# Patient Record
Sex: MALE | Race: WHITE | NOT HISPANIC OR LATINO
[De-identification: names, ages, dates, MRNs, and addresses within clinical notes are randomized per-mention and may not be internally consistent; named-entity substitution may affect disease eponyms.]

---

## 2019-01-25 ENCOUNTER — APPOINTMENT (OUTPATIENT)
Dept: FAMILY MEDICINE | Facility: CLINIC | Age: 54
End: 2019-01-25
Payer: COMMERCIAL

## 2019-01-25 VITALS
OXYGEN SATURATION: 93 % | SYSTOLIC BLOOD PRESSURE: 116 MMHG | HEART RATE: 55 BPM | WEIGHT: 178 LBS | BODY MASS INDEX: 25.48 KG/M2 | TEMPERATURE: 98.6 F | HEIGHT: 70 IN | DIASTOLIC BLOOD PRESSURE: 77 MMHG

## 2019-01-25 DIAGNOSIS — Z87.898 PERSONAL HISTORY OF OTHER SPECIFIED CONDITIONS: ICD-10-CM

## 2019-01-25 DIAGNOSIS — Z87.01 PERSONAL HISTORY OF PNEUMONIA (RECURRENT): ICD-10-CM

## 2019-01-25 DIAGNOSIS — Z81.8 FAMILY HISTORY OF OTHER MENTAL AND BEHAVIORAL DISORDERS: ICD-10-CM

## 2019-01-25 DIAGNOSIS — Z86.19 PERSONAL HISTORY OF OTHER INFECTIOUS AND PARASITIC DISEASES: ICD-10-CM

## 2019-01-25 DIAGNOSIS — G62.9 POLYNEUROPATHY, UNSPECIFIED: ICD-10-CM

## 2019-01-25 DIAGNOSIS — Z87.891 PERSONAL HISTORY OF NICOTINE DEPENDENCE: ICD-10-CM

## 2019-01-25 DIAGNOSIS — Z83.3 FAMILY HISTORY OF DIABETES MELLITUS: ICD-10-CM

## 2019-01-25 DIAGNOSIS — F31.9 BIPOLAR DISORDER, UNSPECIFIED: ICD-10-CM

## 2019-01-25 DIAGNOSIS — Z81.1 FAMILY HISTORY OF ALCOHOL ABUSE AND DEPENDENCE: ICD-10-CM

## 2019-01-25 PROCEDURE — 36415 COLL VENOUS BLD VENIPUNCTURE: CPT

## 2019-01-25 PROCEDURE — 99386 PREV VISIT NEW AGE 40-64: CPT | Mod: 25

## 2019-01-25 RX ORDER — DIVALPROEX SODIUM 500 1/1
500 TABLET, EXTENDED RELEASE ORAL DAILY
Refills: 0 | Status: ACTIVE | COMMUNITY
Start: 2019-01-25

## 2019-01-25 NOTE — ASSESSMENT
[FreeTextEntry1] : This is a 54 y/o male with PMHx including Bipolar disorder II sees Dr Richy Talley and therapist is Carisa Galvez, presenting for CPE and to establish as a new patient in the practice\par \par PSYCH: h/o bipolar disorder\par -Continue Care with Dr Talley\par -Continue Xanax, Depakote.\par \par Neuro: Plantar neuropathy\par -Continue Gabapentin prn\par \par HCM:\par -Declines flu vaccine\par -Consents for HIV and Hep C testing\par -Fasting labs\par -Will get report from GI - Colonoscopy

## 2019-01-25 NOTE — PHYSICAL EXAM

## 2019-01-25 NOTE — HEALTH RISK ASSESSMENT
[Good] : ~his/her~  mood as  good [No falls in past year] : Patient reported no falls in the past year [0] : 2) Feeling down, depressed, or hopeless: Not at all (0) [Patient reported colonoscopy was normal] : Patient reported colonoscopy was normal [HIV Test offered] : HIV Test offered [Hepatitis C test offered] : Hepatitis C test offered [None] : None [With Family] : lives with family [# of Members in Household ___] :  household currently consist of [unfilled] member(s) [Employed] : employed [College] : College [] :  [# Of Children ___] : has [unfilled] children [Sexually Active] : sexually active [Feels Safe at Home] : Feels safe at home [Fully functional (bathing, dressing, toileting, transferring, walking, feeding)] : Fully functional (bathing, dressing, toileting, transferring, walking, feeding) [Fully functional (using the telephone, shopping, preparing meals, housekeeping, doing laundry, using] : Fully functional and needs no help or supervision to perform IADLs (using the telephone, shopping, preparing meals, housekeeping, doing laundry, using transportation, managing medications and managing finances) [Smoke Detector] : smoke detector [Carbon Monoxide Detector] : carbon monoxide detector [Seat Belt] :  uses seat belt [Sunscreen] : uses sunscreen [Discussed at today's visit] : Advance Directives Discussed at today's visit [FreeTextEntry1] : foot pain [] : No [[id number]] : 0 [Change in mental status noted] : No change in mental status noted [Language] : denies difficulty with language [Behavior] : denies difficulty with behavior [Learning/Retaining New Information] : denies difficulty learning/retaining new information [Handling Complex Tasks] : denies difficulty handling complex tasks [Reasoning] : denies difficulty with reasoning [Spatial Ability and Orientation] : denies difficulty with spatial ability and orientation [High Risk Behavior] : no high risk behavior [Reports changes in hearing] : Reports no changes in hearing [Reports changes in vision] : Reports no changes in vision [Reports changes in dental health] : Reports no changes in dental health [Guns at Home] : no guns at home [Travel to Developing Areas] : does not  travel to developing areas [TB Exposure] : is not being exposed to tuberculosis [Caregiver Concerns] : does not have caregiver concerns [ColonoscopyDate] : 2017 [ColonoscopyComments] : Dr Ga [de-identified] : retired [FreeTextEntry2] : Used to be a

## 2019-01-25 NOTE — COUNSELING
[Weight management counseling provided] : Weight management [Healthy eating counseling provided] : healthy eating [Activity counseling provided] : activity [Smoking cessation counseling provided] : smoking cessation [Behavioral health counseling provided] : behavioral health  [___ min/wk activity recommended] : [unfilled] min/wk activity recommended [None] : None [Good understanding] : Patient has a good understanding of lifestyle changes and the steps needed to achieve self management goals

## 2019-01-25 NOTE — HISTORY OF PRESENT ILLNESS
[FreeTextEntry1] : establish as a new patient in the practice and CPE [de-identified] : This is a 52 y/o male with PMHx including Bipolar disorder II sees Dr Richy Talley and therapist is Carisa Galvez, presenting for CPE and to establish as a new patient in the practice.

## 2019-01-29 LAB
25(OH)D3 SERPL-MCNC: 25.8 NG/ML
ALBUMIN SERPL ELPH-MCNC: 4.2 G/DL
ALP BLD-CCNC: 52 U/L
ALT SERPL-CCNC: 22 U/L
ANION GAP SERPL CALC-SCNC: 10 MMOL/L
APPEARANCE: CLEAR
AST SERPL-CCNC: 23 U/L
BACTERIA: NEGATIVE
BASOPHILS # BLD AUTO: 0.02 K/UL
BASOPHILS NFR BLD AUTO: 0.5 %
BILIRUB SERPL-MCNC: 0.6 MG/DL
BILIRUBIN URINE: NEGATIVE
BLOOD URINE: NEGATIVE
BUN SERPL-MCNC: 9 MG/DL
CALCIUM SERPL-MCNC: 9.3 MG/DL
CHLORIDE SERPL-SCNC: 102 MMOL/L
CHOLEST SERPL-MCNC: 169 MG/DL
CHOLEST/HDLC SERPL: 3.5 RATIO
CO2 SERPL-SCNC: 25 MMOL/L
COLOR: YELLOW
CREAT SERPL-MCNC: 0.81 MG/DL
EOSINOPHIL # BLD AUTO: 0.23 K/UL
EOSINOPHIL NFR BLD AUTO: 5.2 %
GLUCOSE QUALITATIVE U: NEGATIVE MG/DL
GLUCOSE SERPL-MCNC: 87 MG/DL
HBA1C MFR BLD HPLC: 5.4 %
HCT VFR BLD CALC: 39.5 %
HCV AB SER QL: NONREACTIVE
HCV S/CO RATIO: 0.06 S/CO
HDLC SERPL-MCNC: 48 MG/DL
HGB BLD-MCNC: 13.4 G/DL
HIV1+2 AB SPEC QL IA.RAPID: NONREACTIVE
IMM GRANULOCYTES NFR BLD AUTO: 0.2 %
KETONES URINE: NEGATIVE
LDLC SERPL CALC-MCNC: 104 MG/DL
LEUKOCYTE ESTERASE URINE: NEGATIVE
LYMPHOCYTES # BLD AUTO: 1.87 K/UL
LYMPHOCYTES NFR BLD AUTO: 42.5 %
MAN DIFF?: NORMAL
MCHC RBC-ENTMCNC: 30.2 PG
MCHC RBC-ENTMCNC: 33.9 GM/DL
MCV RBC AUTO: 89 FL
MICROSCOPIC-UA: NORMAL
MONOCYTES # BLD AUTO: 0.24 K/UL
MONOCYTES NFR BLD AUTO: 5.5 %
NEUTROPHILS # BLD AUTO: 2.03 K/UL
NEUTROPHILS NFR BLD AUTO: 46.1 %
NITRITE URINE: NEGATIVE
PH URINE: 6.5
PLATELET # BLD AUTO: 175 K/UL
POTASSIUM SERPL-SCNC: 4.2 MMOL/L
PROT SERPL-MCNC: 6.4 G/DL
PROTEIN URINE: NEGATIVE MG/DL
PSA SERPL-MCNC: 0.99 NG/ML
RBC # BLD: 4.44 M/UL
RBC # FLD: 13.3 %
RED BLOOD CELLS URINE: 0 /HPF
SODIUM SERPL-SCNC: 137 MMOL/L
SPECIFIC GRAVITY URINE: 1
SQUAMOUS EPITHELIAL CELLS: 0 /HPF
TRIGL SERPL-MCNC: 84 MG/DL
TSH SERPL-ACNC: 1.63 UIU/ML
UROBILINOGEN URINE: NEGATIVE MG/DL
WBC # FLD AUTO: 4.4 K/UL
WHITE BLOOD CELLS URINE: 0 /HPF

## 2019-01-31 DIAGNOSIS — H61.899 OTHER SPECIFIED DISORDERS OF EXTERNAL EAR, UNSPECIFIED EAR: ICD-10-CM

## 2019-02-26 ENCOUNTER — APPOINTMENT (OUTPATIENT)
Dept: INTERNAL MEDICINE | Facility: CLINIC | Age: 54
End: 2019-02-26

## 2019-05-09 ENCOUNTER — APPOINTMENT (OUTPATIENT)
Dept: FAMILY MEDICINE | Facility: CLINIC | Age: 54
End: 2019-05-09
Payer: COMMERCIAL

## 2019-05-09 VITALS
BODY MASS INDEX: 25.91 KG/M2 | DIASTOLIC BLOOD PRESSURE: 91 MMHG | TEMPERATURE: 97.1 F | OXYGEN SATURATION: 97 % | SYSTOLIC BLOOD PRESSURE: 151 MMHG | HEIGHT: 70 IN | HEART RATE: 64 BPM | WEIGHT: 181 LBS | RESPIRATION RATE: 18 BRPM

## 2019-05-09 DIAGNOSIS — Z82.49 FAMILY HISTORY OF ISCHEMIC HEART DISEASE AND OTHER DISEASES OF THE CIRCULATORY SYSTEM: ICD-10-CM

## 2019-05-09 PROCEDURE — 99212 OFFICE O/P EST SF 10 MIN: CPT

## 2019-05-09 NOTE — ASSESSMENT
[FreeTextEntry1] : As per patient's request, will give him referral for Cardiology and Dermatology.\par Will follow up on recommendations / plan of care.

## 2019-05-09 NOTE — HISTORY OF PRESENT ILLNESS
[FreeTextEntry1] : referrals [de-identified] : This is a 52 y/o male with PMHx including Bipolar disorder II sees Dr Richy Talley and therapist is Carisa Galvez, presenting requesting to have referral s for specialties. Pt requests referral for cardiology, last visit 5 years ago (Dr Christina), pt worried because strong family h/o CAD (Father and Brother in their 50s), also wants full body skin examination since he spends a lot of time in the sun and has fair skin. Pt currently offers no new complains.

## 2019-06-10 ENCOUNTER — APPOINTMENT (OUTPATIENT)
Dept: CARDIOLOGY | Facility: CLINIC | Age: 54
End: 2019-06-10
Payer: COMMERCIAL

## 2019-06-10 ENCOUNTER — NON-APPOINTMENT (OUTPATIENT)
Age: 54
End: 2019-06-10

## 2019-06-10 VITALS — DIASTOLIC BLOOD PRESSURE: 90 MMHG | SYSTOLIC BLOOD PRESSURE: 151 MMHG

## 2019-06-10 VITALS
RESPIRATION RATE: 18 BRPM | HEART RATE: 62 BPM | DIASTOLIC BLOOD PRESSURE: 81 MMHG | OXYGEN SATURATION: 98 % | WEIGHT: 180 LBS | SYSTOLIC BLOOD PRESSURE: 130 MMHG | BODY MASS INDEX: 25.83 KG/M2

## 2019-06-10 DIAGNOSIS — I51.89 OTHER ILL-DEFINED HEART DISEASES: ICD-10-CM

## 2019-06-10 PROCEDURE — 93000 ELECTROCARDIOGRAM COMPLETE: CPT

## 2019-06-10 PROCEDURE — 99204 OFFICE O/P NEW MOD 45 MIN: CPT

## 2019-06-10 RX ORDER — ALPRAZOLAM 1 MG/1
1 TABLET ORAL DAILY
Refills: 0 | Status: ACTIVE | COMMUNITY

## 2019-06-10 NOTE — PHYSICAL EXAM
[General Appearance - Well Developed] : well developed [Well Groomed] : well groomed [Normal Appearance] : normal appearance [General Appearance - Well Nourished] : well nourished [No Deformities] : no deformities [Normal Conjunctiva] : the conjunctiva exhibited no abnormalities [General Appearance - In No Acute Distress] : no acute distress [Normal Oral Mucosa] : normal oral mucosa [FreeTextEntry1] : No JVD [Heart Rate And Rhythm] : heart rate and rhythm were normal [Heart Sounds] : normal S1 and S2 [Murmurs] : no murmurs present [Auscultation Breath Sounds / Voice Sounds] : lungs were clear to auscultation bilaterally [Bowel Sounds] : normal bowel sounds [Abdomen Soft] : soft [Cyanosis, Localized] : no localized cyanosis [Abnormal Walk] : normal gait [] : no rash [Oriented To Time, Place, And Person] : oriented to person, place, and time

## 2019-06-10 NOTE — HISTORY OF PRESENT ILLNESS
[FreeTextEntry1] : Patient is a 53 year old  male with strong family history of premature CAD, father had MI at 49, had CABG, other family members have history of premature CAD. Patient has no DM or HTN. Patient has no history of CAD or MI. Patient denies any smoking history. Patient has no complaints. No exertional complaints. All other systems reviewed negative.

## 2019-06-10 NOTE — ASSESSMENT
[FreeTextEntry1] : EKG- Sinus bradycardia\par \par Assessment:\par 1. Sinus Bradycardia\par 2. Strong family hisotry of premature CAD\par \par Recommendations:\par 1. Stress Test and ECHO\par 2. If both tests are normal, patient is advised to f/u in 1 year

## 2019-06-27 ENCOUNTER — APPOINTMENT (OUTPATIENT)
Dept: DERMATOLOGY | Facility: CLINIC | Age: 54
End: 2019-06-27
Payer: COMMERCIAL

## 2019-06-27 PROCEDURE — 99202 OFFICE O/P NEW SF 15 MIN: CPT

## 2019-07-11 ENCOUNTER — OUTPATIENT (OUTPATIENT)
Dept: OUTPATIENT SERVICES | Facility: HOSPITAL | Age: 54
LOS: 1 days | End: 2019-07-11
Payer: COMMERCIAL

## 2019-07-11 DIAGNOSIS — R00.1 BRADYCARDIA, UNSPECIFIED: ICD-10-CM

## 2019-07-11 PROCEDURE — 93306 TTE W/DOPPLER COMPLETE: CPT

## 2019-07-11 PROCEDURE — 93306 TTE W/DOPPLER COMPLETE: CPT | Mod: 26

## 2019-07-12 ENCOUNTER — APPOINTMENT (OUTPATIENT)
Dept: CARDIOLOGY | Facility: CLINIC | Age: 54
End: 2019-07-12
Payer: COMMERCIAL

## 2019-07-12 PROCEDURE — 93015 CV STRESS TEST SUPVJ I&R: CPT

## 2019-07-13 ENCOUNTER — TRANSCRIPTION ENCOUNTER (OUTPATIENT)
Age: 54
End: 2019-07-13

## 2019-07-15 DIAGNOSIS — R94.39 ABNORMAL RESULT OF OTHER CARDIOVASCULAR FUNCTION STUDY: ICD-10-CM

## 2019-08-07 ENCOUNTER — APPOINTMENT (OUTPATIENT)
Dept: CARDIOLOGY | Facility: CLINIC | Age: 54
End: 2019-08-07

## 2019-08-07 ENCOUNTER — OTHER (OUTPATIENT)
Age: 54
End: 2019-08-07

## 2019-08-16 ENCOUNTER — APPOINTMENT (OUTPATIENT)
Dept: CARDIOLOGY | Facility: CLINIC | Age: 54
End: 2019-08-16

## 2019-08-20 ENCOUNTER — OUTPATIENT (OUTPATIENT)
Dept: OUTPATIENT SERVICES | Facility: HOSPITAL | Age: 54
LOS: 1 days | End: 2019-08-20
Payer: COMMERCIAL

## 2019-08-20 DIAGNOSIS — R94.39 ABNORMAL RESULT OF OTHER CARDIOVASCULAR FUNCTION STUDY: ICD-10-CM

## 2019-08-20 PROCEDURE — 93351 STRESS TTE COMPLETE: CPT

## 2019-08-20 PROCEDURE — 93351 STRESS TTE COMPLETE: CPT | Mod: 26

## 2020-01-22 ENCOUNTER — TRANSCRIPTION ENCOUNTER (OUTPATIENT)
Age: 55
End: 2020-01-22

## 2020-06-22 ENCOUNTER — APPOINTMENT (OUTPATIENT)
Dept: CARDIOLOGY | Facility: CLINIC | Age: 55
End: 2020-06-22

## 2020-07-12 ENCOUNTER — TRANSCRIPTION ENCOUNTER (OUTPATIENT)
Age: 55
End: 2020-07-12

## 2021-09-26 ENCOUNTER — TRANSCRIPTION ENCOUNTER (OUTPATIENT)
Age: 56
End: 2021-09-26

## 2021-11-29 ENCOUNTER — NON-APPOINTMENT (OUTPATIENT)
Age: 56
End: 2021-11-29

## 2021-12-15 ENCOUNTER — APPOINTMENT (OUTPATIENT)
Dept: INTERNAL MEDICINE | Facility: CLINIC | Age: 56
End: 2021-12-15
Payer: COMMERCIAL

## 2021-12-15 ENCOUNTER — NON-APPOINTMENT (OUTPATIENT)
Age: 56
End: 2021-12-15

## 2021-12-15 VITALS
BODY MASS INDEX: 25.62 KG/M2 | HEART RATE: 30 BPM | SYSTOLIC BLOOD PRESSURE: 136 MMHG | TEMPERATURE: 97.7 F | RESPIRATION RATE: 17 BRPM | HEIGHT: 70 IN | WEIGHT: 179 LBS | OXYGEN SATURATION: 99 % | DIASTOLIC BLOOD PRESSURE: 83 MMHG

## 2021-12-15 DIAGNOSIS — G60.3 IDIOPATHIC PROGRESSIVE NEUROPATHY: ICD-10-CM

## 2021-12-15 DIAGNOSIS — Z76.89 PERSONS ENCOUNTERING HEALTH SERVICES IN OTHER SPECIFIED CIRCUMSTANCES: ICD-10-CM

## 2021-12-15 DIAGNOSIS — Z13.31 ENCOUNTER FOR SCREENING FOR DEPRESSION: ICD-10-CM

## 2021-12-15 DIAGNOSIS — M19.90 UNSPECIFIED OSTEOARTHRITIS, UNSPECIFIED SITE: ICD-10-CM

## 2021-12-15 DIAGNOSIS — E55.9 VITAMIN D DEFICIENCY, UNSPECIFIED: ICD-10-CM

## 2021-12-15 DIAGNOSIS — R00.1 BRADYCARDIA, UNSPECIFIED: ICD-10-CM

## 2021-12-15 DIAGNOSIS — Z23 ENCOUNTER FOR IMMUNIZATION: ICD-10-CM

## 2021-12-15 DIAGNOSIS — Z13.6 ENCOUNTER FOR SCREENING FOR CARDIOVASCULAR DISORDERS: ICD-10-CM

## 2021-12-15 PROCEDURE — G0444 DEPRESSION SCREEN ANNUAL: CPT | Mod: 59

## 2021-12-15 PROCEDURE — 93000 ELECTROCARDIOGRAM COMPLETE: CPT | Mod: 59

## 2021-12-15 PROCEDURE — 90472 IMMUNIZATION ADMIN EACH ADD: CPT

## 2021-12-15 PROCEDURE — G0008: CPT | Mod: 59

## 2021-12-15 PROCEDURE — 90686 IIV4 VACC NO PRSV 0.5 ML IM: CPT

## 2021-12-15 PROCEDURE — 90715 TDAP VACCINE 7 YRS/> IM: CPT

## 2021-12-15 PROCEDURE — 99396 PREV VISIT EST AGE 40-64: CPT | Mod: 25

## 2021-12-15 RX ORDER — GABAPENTIN 800 MG/1
800 TABLET, COATED ORAL
Refills: 0 | Status: ACTIVE | COMMUNITY

## 2021-12-15 RX ORDER — QUETIAPINE 50 MG/1
50 TABLET, FILM COATED ORAL
Refills: 0 | Status: ACTIVE | COMMUNITY

## 2021-12-15 RX ORDER — GABAPENTIN 300 MG/1
300 CAPSULE ORAL
Qty: 120 | Refills: 0 | Status: DISCONTINUED | COMMUNITY
Start: 2019-01-25 | End: 2021-12-15

## 2021-12-15 NOTE — HISTORY OF PRESENT ILLNESS
[FreeTextEntry1] : CPE  and establish care  [de-identified] : 55 yo M PMHx bipolar type 2 (follows up psychiatrist Dr. Richy Talley, therapist Dr. Moise), peripheral neuropathy here for CPE and estalbish care. \par Patient states he feels well. All medical problems are stable and well controlled. \par He exercises daily and follows healthy diet. Has strong family history of CAD therefore does not smoke or drink alcohol. \par Denies any acute complaints at the moments.

## 2021-12-15 NOTE — HEALTH RISK ASSESSMENT
[Never] : Never [No] : In the past 12 months have you used drugs other than those required for medical reasons? No [No falls in past year] : Patient reported no falls in the past year [0] : 2) Feeling down, depressed, or hopeless: Not at all (0) [PHQ-2 Negative - No further assessment needed] : PHQ-2 Negative - No further assessment needed [Patient reported colonoscopy was normal] : Patient reported colonoscopy was normal [HIV test declined] : HIV test declined [Hepatitis C test declined] : Hepatitis C test declined [None] : None [With Family] : lives with family [Retired] : retired [Graduate School] : graduate school [] :  [# Of Children ___] : has [unfilled] children [Sexually Active] : sexually active [Feels Safe at Home] : Feels safe at home [Fully functional (bathing, dressing, toileting, transferring, walking, feeding)] : Fully functional (bathing, dressing, toileting, transferring, walking, feeding) [Fully functional (using the telephone, shopping, preparing meals, housekeeping, doing laundry, using] : Fully functional and needs no help or supervision to perform IADLs (using the telephone, shopping, preparing meals, housekeeping, doing laundry, using transportation, managing medications and managing finances) [With Patient/Caregiver] : , with patient/caregiver [Reviewed no changes] : Reviewed, no changes [Designated Healthcare Proxy] : Designated healthcare proxy [Name: ___] : Health Care Proxy's Name: [unfilled]  [Relationship: ___] : Relationship: [unfilled] [Aggressive treatment] : aggressive treatment [I will adhere to the patient's wishes.] : I will adhere to the patient's wishes. 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  [de-identified] : Has not drank in the last 5 years  [de-identified] : Daily  [de-identified] : Balanced  [[id number]] : 0 [Change in mental status noted] : No change in mental status noted [Language] : denies difficulty with language [Behavior] : denies difficulty with behavior [Learning/Retaining New Information] : denies difficulty learning/retaining new information [Handling Complex Tasks] : denies difficulty handling complex tasks [Reasoning] : denies difficulty with reasoning [Spatial Ability and Orientation] : denies difficulty with spatial ability and orientation [Reports changes in hearing] : Reports no changes in hearing [Reports changes in vision] : Reports no changes in vision [ColonoscopyDate] : 2017 [ColonoscopyComments] : Patient reported normal Dr. Bey  [FreeTextEntry2] : b [de-identified] : [employer]  [de-identified] : no reported problems  [AdvancecareDate] : 12/15

## 2021-12-15 NOTE — ASSESSMENT
[FreeTextEntry1] : HCM \par UTD colonscopy as per pt normal 2017 with 10 year interval follow up\par EKG sinus [place], artifact v3 v4 otherwise unchanged from prior \par flu and tdap administere today \par Shingrix sent to [facility] \par Negative depression screening \par \par Bipolar type 2- stable \par Xanax and divalproex perscribed by psych \par \par Idiopathic peripheral neuropathy- stable \par Cont gabapentin current dose \par \par \par

## 2021-12-17 LAB
25(OH)D3 SERPL-MCNC: 25.2 NG/ML
ALBUMIN SERPL ELPH-MCNC: 4.6 G/DL
ALP BLD-CCNC: 67 U/L
ALT SERPL-CCNC: 14 U/L
ANION GAP SERPL CALC-SCNC: 9 MMOL/L
APPEARANCE: CLEAR
AST SERPL-CCNC: 17 U/L
BACTERIA: NEGATIVE
BASOPHILS # BLD AUTO: 0.04 K/UL
BASOPHILS NFR BLD AUTO: 0.9 %
BILIRUB SERPL-MCNC: 0.5 MG/DL
BILIRUBIN URINE: NEGATIVE
BLOOD URINE: NEGATIVE
BUN SERPL-MCNC: 9 MG/DL
CALCIUM SERPL-MCNC: 9.9 MG/DL
CHLORIDE SERPL-SCNC: 105 MMOL/L
CHOLEST SERPL-MCNC: 172 MG/DL
CO2 SERPL-SCNC: 27 MMOL/L
COLOR: NORMAL
CREAT SERPL-MCNC: 0.87 MG/DL
CREAT SPEC-SCNC: 69 MG/DL
EOSINOPHIL # BLD AUTO: 0.29 K/UL
EOSINOPHIL NFR BLD AUTO: 6.7 %
ESTIMATED AVERAGE GLUCOSE: 114 MG/DL
GLUCOSE QUALITATIVE U: NEGATIVE
GLUCOSE SERPL-MCNC: 95 MG/DL
HBA1C MFR BLD HPLC: 5.6 %
HCT VFR BLD CALC: 46.1 %
HDLC SERPL-MCNC: 50 MG/DL
HGB BLD-MCNC: 14.9 G/DL
HYALINE CASTS: 0 /LPF
IMM GRANULOCYTES NFR BLD AUTO: 0.2 %
KETONES URINE: NEGATIVE
LDLC SERPL CALC-MCNC: 103 MG/DL
LEUKOCYTE ESTERASE URINE: NEGATIVE
LYMPHOCYTES # BLD AUTO: 1.54 K/UL
LYMPHOCYTES NFR BLD AUTO: 35.8 %
MAN DIFF?: NORMAL
MCHC RBC-ENTMCNC: 30.2 PG
MCHC RBC-ENTMCNC: 32.3 GM/DL
MCV RBC AUTO: 93.3 FL
MICROALBUMIN 24H UR DL<=1MG/L-MCNC: <1.2 MG/DL
MICROALBUMIN/CREAT 24H UR-RTO: NORMAL MG/G
MICROSCOPIC-UA: NORMAL
MONOCYTES # BLD AUTO: 0.29 K/UL
MONOCYTES NFR BLD AUTO: 6.7 %
NEUTROPHILS # BLD AUTO: 2.13 K/UL
NEUTROPHILS NFR BLD AUTO: 49.7 %
NITRITE URINE: NEGATIVE
NONHDLC SERPL-MCNC: 122 MG/DL
PH URINE: 6
PLATELET # BLD AUTO: 187 K/UL
POTASSIUM SERPL-SCNC: 5.2 MMOL/L
PROT SERPL-MCNC: 6.5 G/DL
PROTEIN URINE: NEGATIVE
PSA SERPL-MCNC: 1.59 NG/ML
RBC # BLD: 4.94 M/UL
RBC # FLD: 13.2 %
RED BLOOD CELLS URINE: 0 /HPF
SODIUM SERPL-SCNC: 141 MMOL/L
SPECIFIC GRAVITY URINE: 1.01
SQUAMOUS EPITHELIAL CELLS: 0 /HPF
TRIGL SERPL-MCNC: 96 MG/DL
TSH SERPL-ACNC: 1.57 UIU/ML
UROBILINOGEN URINE: NORMAL
WBC # FLD AUTO: 4.3 K/UL
WHITE BLOOD CELLS URINE: 0 /HPF

## 2022-10-13 PROBLEM — Z13.31 SCREENING FOR DEPRESSION: Status: ACTIVE | Noted: 2021-12-15

## 2022-10-22 ENCOUNTER — APPOINTMENT (OUTPATIENT)
Dept: AFTER HOURS CARE | Facility: EMERGENCY ROOM | Age: 57
End: 2022-10-22

## 2022-10-22 DIAGNOSIS — U07.1 COVID-19: ICD-10-CM

## 2022-10-22 PROCEDURE — 99204 OFFICE O/P NEW MOD 45 MIN: CPT | Mod: 95

## 2022-10-22 NOTE — PLAN
[With new medications prescribed] : Treat in place: with new medications prescribed [FreeTextEntry1] : -Rx Paxlovid\par -C/w Tylenol prn and over the counter decongestant medications\par -rest and fluid hydration, quarantine for five days from the onset of symptoms\par -strict anticipatory guidance and went to escalate to the emergency department, development of chest pain with exertion or at rest, pleuritic chest pain, shortness of breath, or pulse ox reading less than 94%\par -close f/u w/ PMD in 1-3 days

## 2022-10-22 NOTE — HISTORY OF PRESENT ILLNESS
[Home] : at home, [unfilled] , at the time of the visit. [Other Location: e.g. Home (Enter Location, City,State)___] : at [unfilled] [Verbal consent obtained from patient] : the patient, [unfilled] [FreeTextEntry8] : 57y M w/ PMHx Bipolar Disorder presenting with three days of fever, myalgias, cough, congestion, fatigue. States he has Covid vaccinated and boosted. States he has been visiting his mother in the hospital who is on a COVID floor, and states she is not doing well. After his visit on Thursday, noted he developed his symptoms that night. Has only been taking Tylenol for his symptoms and over-the-counter decongestant medication. Denies headache, dizziness, shortness of breath, chest pain, nausea, vomiting, diarrhea, rash. Patient does state at night when he lies down he feels a "heaviness" to his chest that resolves in the morning without recurrence. States it is constant but also states he feels achy at the end of the day all over his body. Denies chest pain with exertion, focal numbness or weakness, diaphoresis. During exam, denies chest pain. Denies tobacco use hx.

## 2022-10-22 NOTE — ASSESSMENT
[FreeTextEntry1] : Relatively well appearing male with intermittent coughing during exam, with positive Covid test times to yesterday. Patient is vaccinated but does have risk factors given psychiatric history of bipolar disorder. Patient is also requesting medication to help expedite symptom resolution so he can see his mother again. Will prescribe antiviral, recommend continuation of over-the-counter medication for symptomatic relief, strict anticipatory guidance on when to escalate to the ED if pt develops chest pain with exertion, pleuritic chest pain, sob, or 02 sat < 94%. Patient instructed to purchase pulse oximeter.

## 2022-10-22 NOTE — REVIEW OF SYSTEMS
[Fever] : fever [Chills] : chills [Fatigue] : fatigue [Postnasal Drip] : postnasal drip [Nasal Discharge] : nasal discharge [Sore Throat] : sore throat [Chest Pain] : no chest pain [Palpitations] : no palpitations [Shortness Of Breath] : no shortness of breath [Wheezing] : no wheezing [Cough] : cough [Nausea] : no nausea [Diarrhea] : no diarrhea [Vomiting] : no vomiting [Dysuria] : no dysuria [Hematuria] : no hematuria [Joint Pain] : no joint pain [Muscle Pain] : muscle pain [Skin Rash] : no skin rash [Headache] : no headache

## 2022-10-22 NOTE — PHYSICAL EXAM
[No Acute Distress] : no acute distress [Well-Appearing] : well-appearing [Normal Sclera/Conjunctiva] : normal sclera/conjunctiva [EOMI] : extraocular movements intact [Normal Outer Ear/Nose] : the outer ears and nose were normal in appearance [Normal Oropharynx] : the oropharynx was normal [No Respiratory Distress] : no respiratory distress  [No Rash] : no rash [Speech Grossly Normal] : speech grossly normal [Alert and Oriented x3] : oriented to person, place, and time [de-identified] : +coughing throughout exam

## 2023-05-22 ENCOUNTER — NON-APPOINTMENT (OUTPATIENT)
Age: 58
End: 2023-05-22

## 2023-07-17 ENCOUNTER — NON-APPOINTMENT (OUTPATIENT)
Age: 58
End: 2023-07-17

## 2023-07-17 ENCOUNTER — APPOINTMENT (OUTPATIENT)
Dept: INTERNAL MEDICINE | Facility: CLINIC | Age: 58
End: 2023-07-17
Payer: COMMERCIAL

## 2023-07-17 VITALS
HEIGHT: 70 IN | OXYGEN SATURATION: 97 % | DIASTOLIC BLOOD PRESSURE: 79 MMHG | SYSTOLIC BLOOD PRESSURE: 138 MMHG | TEMPERATURE: 97.6 F | WEIGHT: 179 LBS | HEART RATE: 55 BPM | RESPIRATION RATE: 16 BRPM | BODY MASS INDEX: 25.62 KG/M2

## 2023-07-17 DIAGNOSIS — Z00.00 ENCOUNTER FOR GENERAL ADULT MEDICAL EXAMINATION W/OUT ABNORMAL FINDINGS: ICD-10-CM

## 2023-07-17 PROCEDURE — 93000 ELECTROCARDIOGRAM COMPLETE: CPT | Mod: 59

## 2023-07-17 PROCEDURE — 99396 PREV VISIT EST AGE 40-64: CPT | Mod: 25

## 2023-07-17 PROCEDURE — G0444 DEPRESSION SCREEN ANNUAL: CPT | Mod: 59

## 2023-07-17 RX ORDER — FLUTICASONE PROPIONATE 50 UG/1
50 SPRAY, METERED NASAL
Qty: 16 | Refills: 0 | Status: DISCONTINUED | COMMUNITY
Start: 2023-05-23

## 2023-07-17 RX ORDER — ZOSTER VACCINE RECOMBINANT, ADJUVANTED 50 MCG/0.5
50 KIT INTRAMUSCULAR
Qty: 2 | Refills: 0 | Status: ACTIVE | COMMUNITY
Start: 2023-07-17 | End: 1900-01-01

## 2023-07-17 RX ORDER — NIRMATRELVIR AND RITONAVIR 300-100 MG
20 X 150 MG & KIT ORAL
Qty: 30 | Refills: 0 | Status: DISCONTINUED | COMMUNITY
Start: 2022-10-22 | End: 2023-07-17

## 2023-07-17 RX ORDER — ALPRAZOLAM 0.5 MG/1
0.5 TABLET ORAL
Qty: 90 | Refills: 0 | Status: DISCONTINUED | COMMUNITY
Start: 2023-06-22

## 2023-07-17 RX ORDER — DICLOFENAC SODIUM 1% 10 MG/G
1 GEL TOPICAL DAILY
Qty: 1 | Refills: 0 | Status: DISCONTINUED | COMMUNITY
Start: 2021-12-15 | End: 2023-07-17

## 2023-07-17 RX ORDER — SERTRALINE 25 MG/1
25 TABLET, FILM COATED ORAL
Qty: 30 | Refills: 0 | Status: DISCONTINUED | COMMUNITY
Start: 2023-04-07

## 2023-07-17 RX ORDER — QUETIAPINE 50 MG/1
50 TABLET, FILM COATED, EXTENDED RELEASE ORAL
Qty: 30 | Refills: 0 | Status: DISCONTINUED | COMMUNITY
Start: 2023-06-26

## 2023-07-17 RX ORDER — ZOSTER VACCINE RECOMBINANT, ADJUVANTED 50 MCG/0.5
50 KIT INTRAMUSCULAR
Qty: 1 | Refills: 0 | Status: DISCONTINUED | COMMUNITY
Start: 2021-12-15 | End: 2023-07-17

## 2023-07-17 NOTE — HEALTH RISK ASSESSMENT
[Patient reported colonoscopy was normal] : Patient reported colonoscopy was normal [Yes] : Yes [Monthly or less (1 pt)] : Monthly or less (1 point) [1 or 2 (0 pts)] : 1 or 2 (0 points) [Never (0 pts)] : Never (0 points) [No] : In the past 12 months have you used drugs other than those required for medical reasons? No [No falls in past year] : Patient reported no falls in the past year [0] : 2) Feeling down, depressed, or hopeless: Not at all (0) [PHQ-2 Negative - No further assessment needed] : PHQ-2 Negative - No further assessment needed [HIV test declined] : HIV test declined [Hepatitis C test declined] : Hepatitis C test declined [None] : None [With Family] : lives with family [# of Members in Household ___] :  household currently consist of [unfilled] member(s) [Employed] : employed [] :  [# Of Children ___] : has [unfilled] children [Sexually Active] : sexually active [Feels Safe at Home] : Feels safe at home [Fully functional (bathing, dressing, toileting, transferring, walking, feeding)] : Fully functional (bathing, dressing, toileting, transferring, walking, feeding) [Fully functional (using the telephone, shopping, preparing meals, housekeeping, doing laundry, using] : Fully functional and needs no help or supervision to perform IADLs (using the telephone, shopping, preparing meals, housekeeping, doing laundry, using transportation, managing medications and managing finances) [With Patient/Caregiver] : , with patient/caregiver [Reviewed updated] : Reviewed, updated [Designated Healthcare Proxy] : Designated healthcare proxy [Name: ___] : Health Care Proxy's Name: [unfilled]  [Relationship: ___] : Relationship: [unfilled] [Aggressive treatment] : aggressive treatment [I will adhere to the patient's wishes.] : I will adhere to the patient's wishes. [Never] : Never [Audit-CScore] : 1 [de-identified] : gym/yoga/golf  [de-identified] : balanced - 4-5 lisset meals a day  [[id number]] : 0 [Change in mental status noted] : No change in mental status noted [Language] : denies difficulty with language [Behavior] : denies difficulty with behavior [Learning/Retaining New Information] : denies difficulty learning/retaining new information [Handling Complex Tasks] : denies difficulty handling complex tasks [Reasoning] : denies difficulty with reasoning [High Risk Behavior] : no high risk behavior [Reports changes in hearing] : Reports no changes in hearing [Reports changes in vision] : Reports no changes in vision [ColonoscopyDate] : 2017 [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

## 2023-07-17 NOTE — HISTORY OF PRESENT ILLNESS
[FreeTextEntry1] : CPE  [de-identified] : 57 yo M PMHx bipolar type 2 (follows up psychiatrist Dr. Richy Talley, therapist Dr. Moise), peripheral neuropathy here for CPE \par Patient states that about a month ago he felt a little fatigued. He is feeling better now. \par [phone]- please fax bw to Psych \par \par hcm\par Colonoscopy: 2017. Patient reported colonoscopy was normal. Patient reported normal Dr. Bey. \par COVID-19 VACCINES: 2/2 + 2 boosters, last around 11/2022 \par Tdap 2021\par Flu vaccine 11/2022

## 2023-07-17 NOTE — ASSESSMENT
[FreeTextEntry1] : 57 yo M PMHx bipolar type 2 (follows up psychiatrist Dr. Richy Talley, therapist Dr. Moise), peripheral neuropathy here for CPE \par Patient states that about a month ago he felt a little fatigued. He is feeling better now. \par [phone]- please fax bw to Psych \par \par hcm\par Colonoscopy: 2017. Patient reported colonoscopy was normal. Patient reported normal Dr. Bey. \par COVID-19 VACCINES: 2/2 + 2 boosters, last around 11/2022 \par Tdap 2021\par Flu vaccine 11/2022 \par Educated about importance of healthy diet, physical acitvity (45 min 5 days a week moderate intensity exercises), proper sleep (8 hours of sleep), importance of screening test for early detection and treatment of cancer. Importance of immunizations including COVID-19 and seasonal flu vaccine in order to prevent or lessen disease. \par \par Bipolar type 2- stable \par Xanax and divalproex perscribed by psych \par \par Idiopathic peripheral neuropathy- stable \par Cont gabapentin current dose \par \par rto 6 mo \par

## 2023-07-18 LAB
25(OH)D3 SERPL-MCNC: 27.1 NG/ML
ALBUMIN SERPL ELPH-MCNC: 4.6 G/DL
ALP BLD-CCNC: 66 U/L
ALT SERPL-CCNC: 14 U/L
ANION GAP SERPL CALC-SCNC: 14 MMOL/L
APPEARANCE: CLEAR
AST SERPL-CCNC: 16 U/L
BACTERIA: NEGATIVE /HPF
BILIRUB SERPL-MCNC: 0.6 MG/DL
BILIRUBIN URINE: NEGATIVE
BLOOD URINE: NEGATIVE
BUN SERPL-MCNC: 12 MG/DL
CALCIUM SERPL-MCNC: 9.5 MG/DL
CAST: 0 /LPF
CHLORIDE SERPL-SCNC: 103 MMOL/L
CHOLEST SERPL-MCNC: 166 MG/DL
CO2 SERPL-SCNC: 22 MMOL/L
COLOR: YELLOW
CREAT SERPL-MCNC: 0.83 MG/DL
EGFR: 101 ML/MIN/1.73M2
EPITHELIAL CELLS: 0 /HPF
ESTIMATED AVERAGE GLUCOSE: 117 MG/DL
FOLATE SERPL-MCNC: 5.9 NG/ML
GLUCOSE QUALITATIVE U: NEGATIVE MG/DL
GLUCOSE SERPL-MCNC: 78 MG/DL
HBA1C MFR BLD HPLC: 5.7 %
HDLC SERPL-MCNC: 52 MG/DL
IRON SATN MFR SERPL: 40 %
IRON SERPL-MCNC: 128 UG/DL
KETONES URINE: NEGATIVE MG/DL
LDLC SERPL CALC-MCNC: 100 MG/DL
LEUKOCYTE ESTERASE URINE: NEGATIVE
MICROSCOPIC-UA: NORMAL
NITRITE URINE: NEGATIVE
NONHDLC SERPL-MCNC: 114 MG/DL
PH URINE: 6.5
POTASSIUM SERPL-SCNC: 4.5 MMOL/L
PROT SERPL-MCNC: 6.5 G/DL
PROTEIN URINE: NEGATIVE MG/DL
PSA SERPL-MCNC: 1.28 NG/ML
RED BLOOD CELLS URINE: 0 /HPF
SODIUM SERPL-SCNC: 139 MMOL/L
SPECIFIC GRAVITY URINE: 1.01
T3 SERPL-MCNC: 127 NG/DL
T3FREE SERPL-MCNC: 3.57 PG/ML
T4 FREE SERPL-MCNC: 1.3 NG/DL
T4 SERPL-MCNC: 6.8 UG/DL
TIBC SERPL-MCNC: 317 UG/DL
TRIGL SERPL-MCNC: 75 MG/DL
TSH SERPL-ACNC: 1.06 UIU/ML
UIBC SERPL-MCNC: 189 UG/DL
UROBILINOGEN URINE: 0.2 MG/DL
VALPROATE SERPL-MCNC: 83 UG/ML
VIT B12 SERPL-MCNC: 482 PG/ML
WHITE BLOOD CELLS URINE: 0 /HPF

## 2023-07-18 RX ORDER — ERGOCALCIFEROL 1.25 MG/1
1.25 MG CAPSULE, LIQUID FILLED ORAL
Qty: 12 | Refills: 2 | Status: ACTIVE | COMMUNITY
Start: 2019-01-29 | End: 1900-01-01

## 2023-11-08 ENCOUNTER — NON-APPOINTMENT (OUTPATIENT)
Age: 58
End: 2023-11-08

## 2023-11-08 LAB — HEMOCCULT STL QL IA: NEGATIVE

## 2024-07-22 ENCOUNTER — APPOINTMENT (OUTPATIENT)
Dept: INTERNAL MEDICINE | Facility: CLINIC | Age: 59
End: 2024-07-22
Payer: COMMERCIAL

## 2024-07-22 ENCOUNTER — NON-APPOINTMENT (OUTPATIENT)
Age: 59
End: 2024-07-22

## 2024-07-22 VITALS
DIASTOLIC BLOOD PRESSURE: 70 MMHG | HEART RATE: 69 BPM | SYSTOLIC BLOOD PRESSURE: 120 MMHG | RESPIRATION RATE: 14 BRPM | OXYGEN SATURATION: 98 % | BODY MASS INDEX: 26.05 KG/M2 | WEIGHT: 182 LBS | HEIGHT: 70 IN

## 2024-07-22 DIAGNOSIS — G62.9 POLYNEUROPATHY, UNSPECIFIED: ICD-10-CM

## 2024-07-22 DIAGNOSIS — R79.89 OTHER SPECIFIED ABNORMAL FINDINGS OF BLOOD CHEMISTRY: ICD-10-CM

## 2024-07-22 DIAGNOSIS — F31.9 BIPOLAR DISORDER, UNSPECIFIED: ICD-10-CM

## 2024-07-22 DIAGNOSIS — G60.3 IDIOPATHIC PROGRESSIVE NEUROPATHY: ICD-10-CM

## 2024-07-22 DIAGNOSIS — Z00.00 ENCOUNTER FOR GENERAL ADULT MEDICAL EXAMINATION W/OUT ABNORMAL FINDINGS: ICD-10-CM

## 2024-07-22 PROCEDURE — 93000 ELECTROCARDIOGRAM COMPLETE: CPT | Mod: 59

## 2024-07-22 PROCEDURE — 99386 PREV VISIT NEW AGE 40-64: CPT

## 2024-07-22 PROCEDURE — G0444 DEPRESSION SCREEN ANNUAL: CPT | Mod: 59

## 2024-07-22 PROCEDURE — 99214 OFFICE O/P EST MOD 30 MIN: CPT | Mod: 25

## 2024-07-22 NOTE — HISTORY OF PRESENT ILLNESS
[FreeTextEntry1] : CPE  [de-identified] : 60 y/o M PMHx bipolar type 2 (follows up psychiatrist Dr. Richy Talley, therapist Dr. Moise), peripheral neuropathy here for CPE.  C/o decreased libido and is concerned about prior labs showing low testosterone levels. takes Viagra for ED. Also, c/lo left lower eyelid swelling, no pain or discharge.

## 2024-07-22 NOTE — PHYSICAL EXAM
[Normal] : affect was normal and insight and judgment were intact [de-identified] : small lower eye lid swelling with no surrounding erythema or discharge

## 2024-07-22 NOTE — REVIEW OF SYSTEMS
[Negative] : Heme/Lymph [Fever] : no fever [Chills] : no chills [Fatigue] : no fatigue [FreeTextEntry2] : Decreased libido

## 2024-07-22 NOTE — HEALTH RISK ASSESSMENT
[Yes] : Yes [Monthly or less (1 pt)] : Monthly or less (1 point) [1 or 2 (0 pts)] : 1 or 2 (0 points) [Never (0 pts)] : Never (0 points) [No] : In the past 12 months have you used drugs other than those required for medical reasons? No [No falls in past year] : Patient reported no falls in the past year [0] : 2) Feeling down, depressed, or hopeless: Not at all (0) [PHQ-2 Negative - No further assessment needed] : PHQ-2 Negative - No further assessment needed [Never] : Never [Patient reported colonoscopy was normal] : Patient reported colonoscopy was normal [HIV test declined] : HIV test declined [Hepatitis C test declined] : Hepatitis C test declined [None] : None [With Family] : lives with family [# of Members in Household ___] :  household currently consist of [unfilled] member(s) [Employed] : employed [] :  [# Of Children ___] : has [unfilled] children [Sexually Active] : sexually active [Feels Safe at Home] : Feels safe at home [Fully functional (bathing, dressing, toileting, transferring, walking, feeding)] : Fully functional (bathing, dressing, toileting, transferring, walking, feeding) [Fully functional (using the telephone, shopping, preparing meals, housekeeping, doing laundry, using] : Fully functional and needs no help or supervision to perform IADLs (using the telephone, shopping, preparing meals, housekeeping, doing laundry, using transportation, managing medications and managing finances) [With Patient/Caregiver] : , with patient/caregiver [Reviewed updated] : Reviewed, updated [Designated Healthcare Proxy] : Designated healthcare proxy [Name: ___] : Health Care Proxy's Name: [unfilled]  [Relationship: ___] : Relationship: [unfilled] [Aggressive treatment] : aggressive treatment [I will adhere to the patient's wishes.] : I will adhere to the patient's wishes. [Little interest or pleasure doing things] : 1) Little interest or pleasure doing things [Feeling down, depressed, or hopeless] : 2) Feeling down, depressed, or hopeless [PHQ-9 Negative - No further assessment needed] : PHQ-9 Negative - No further assessment needed [Time Spent: ___ Minutes] : I spent [unfilled] minutes performing a depression screening for this patient. [Audit-CScore] : 1 [de-identified] : gym/yoga/golf  [de-identified] : balanced - 4-5 lisset meals a day  [[id number]] : 0 [Change in mental status noted] : No change in mental status noted [Language] : denies difficulty with language [Behavior] : denies difficulty with behavior [Learning/Retaining New Information] : denies difficulty learning/retaining new information [Handling Complex Tasks] : denies difficulty handling complex tasks [Reasoning] : denies difficulty with reasoning [High Risk Behavior] : no high risk behavior [Reports changes in hearing] : Reports no changes in hearing [Reports changes in vision] : Reports no changes in vision [ColonoscopyDate] : 2017 [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

## 2024-07-22 NOTE — ASSESSMENT
[FreeTextEntry1] : HMT: Colonoscopy 2017, vaccinations UpToDate, EKG WNL, blood work today Bipolar Disorder: C/w Depakote & Seroquel + Xanax. Follows with psychiatrist & psychologist Erectile Dysfunction: PRN Viagra Low testosterone: rpt levels today, endo referral provided. Will try to avoid testosterone replacement right now, as it might cause increased insomnia & mood disturbances. Neuropathy: Idiopathic, c/w Gabapentin 800mg BID L Lower eye lid swelling: Likely to be a cyst, ophthal referral provided.

## 2024-07-23 LAB
25(OH)D3 SERPL-MCNC: 31.8 NG/ML
ALBUMIN SERPL ELPH-MCNC: 4.6 G/DL
ALP BLD-CCNC: 64 U/L
ALT SERPL-CCNC: 20 U/L
ANION GAP SERPL CALC-SCNC: 13 MMOL/L
APPEARANCE: CLEAR
AST SERPL-CCNC: 20 U/L
BACTERIA: NEGATIVE /HPF
BASOPHILS # BLD AUTO: 0.05 K/UL
BASOPHILS NFR BLD AUTO: 1.1 %
BILIRUB SERPL-MCNC: 0.6 MG/DL
BILIRUBIN URINE: NEGATIVE
BLOOD URINE: NEGATIVE
BUN SERPL-MCNC: 10 MG/DL
CALCIUM SERPL-MCNC: 9.3 MG/DL
CAST: 0 /LPF
CHLORIDE SERPL-SCNC: 102 MMOL/L
CO2 SERPL-SCNC: 23 MMOL/L
COLOR: YELLOW
CREAT SERPL-MCNC: 0.8 MG/DL
EGFR: 101 ML/MIN/1.73M2
EOSINOPHIL # BLD AUTO: 0.27 K/UL
EOSINOPHIL NFR BLD AUTO: 5.8 %
EPITHELIAL CELLS: 0 /HPF
FOLATE SERPL-MCNC: 5.2 NG/ML
GLUCOSE QUALITATIVE U: NEGATIVE MG/DL
GLUCOSE SERPL-MCNC: 95 MG/DL
HCT VFR BLD CALC: 45.4 %
HGB BLD-MCNC: 14.7 G/DL
IMM GRANULOCYTES NFR BLD AUTO: 0.2 %
KETONES URINE: NEGATIVE MG/DL
LEUKOCYTE ESTERASE URINE: NEGATIVE
LYMPHOCYTES # BLD AUTO: 1.91 K/UL
LYMPHOCYTES NFR BLD AUTO: 40.9 %
MAN DIFF?: NORMAL
MCHC RBC-ENTMCNC: 29.7 PG
MCHC RBC-ENTMCNC: 32.4 GM/DL
MCV RBC AUTO: 91.7 FL
MICROSCOPIC-UA: NORMAL
MONOCYTES # BLD AUTO: 0.32 K/UL
MONOCYTES NFR BLD AUTO: 6.9 %
NEUTROPHILS # BLD AUTO: 2.11 K/UL
NEUTROPHILS NFR BLD AUTO: 45.1 %
NITRITE URINE: NEGATIVE
PH URINE: 7.5
PLATELET # BLD AUTO: 177 K/UL
POTASSIUM SERPL-SCNC: 4.3 MMOL/L
PROT SERPL-MCNC: 6.4 G/DL
PROTEIN URINE: NEGATIVE MG/DL
PSA SERPL-MCNC: 1.21 NG/ML
RBC # BLD: 4.95 M/UL
RBC # FLD: 13.3 %
RED BLOOD CELLS URINE: 0 /HPF
SODIUM SERPL-SCNC: 138 MMOL/L
SPECIFIC GRAVITY URINE: 1.01
TSH SERPL-ACNC: 1.59 UIU/ML
UROBILINOGEN URINE: 0.2 MG/DL
VIT B12 SERPL-MCNC: 490 PG/ML
WBC # FLD AUTO: 4.67 K/UL
WHITE BLOOD CELLS URINE: 0 /HPF

## 2024-07-24 LAB
CHOLEST SERPL-MCNC: 181 MG/DL
ESTIMATED AVERAGE GLUCOSE: 117 MG/DL
HBA1C MFR BLD HPLC: 5.7 %
HDLC SERPL-MCNC: 56 MG/DL
LDLC SERPL CALC-MCNC: 111 MG/DL
NONHDLC SERPL-MCNC: 126 MG/DL
TRIGL SERPL-MCNC: 81 MG/DL
VALPROATE SERPL-MCNC: 39 UG/ML

## 2024-07-31 LAB
% FREE TESTOSTERONE - ESO: 1.2 %
FREE TESTOSTERONE - ESO: 72 PG/ML
SHBG-ESOTERIX: 69.7 NMOL/L
TESTOSTERONE SERUM - ESO: 597 NG/DL
TESTOSTERONE: 553 NG/DL